# Patient Record
Sex: FEMALE | Race: WHITE | NOT HISPANIC OR LATINO | URBAN - METROPOLITAN AREA
[De-identification: names, ages, dates, MRNs, and addresses within clinical notes are randomized per-mention and may not be internally consistent; named-entity substitution may affect disease eponyms.]

---

## 2018-07-08 ENCOUNTER — EMERGENCY (EMERGENCY)
Facility: HOSPITAL | Age: 23
LOS: 1 days | Discharge: HOME | End: 2018-07-08
Attending: EMERGENCY MEDICINE | Admitting: EMERGENCY MEDICINE
Payer: COMMERCIAL

## 2018-07-08 VITALS
SYSTOLIC BLOOD PRESSURE: 119 MMHG | HEIGHT: 68 IN | WEIGHT: 139.99 LBS | TEMPERATURE: 98 F | RESPIRATION RATE: 20 BRPM | OXYGEN SATURATION: 100 % | HEART RATE: 81 BPM | DIASTOLIC BLOOD PRESSURE: 82 MMHG

## 2018-07-08 PROBLEM — Z00.00 ENCOUNTER FOR PREVENTIVE HEALTH EXAMINATION: Status: ACTIVE | Noted: 2018-07-08

## 2018-07-08 PROCEDURE — 73590 X-RAY EXAM OF LOWER LEG: CPT

## 2018-07-08 PROCEDURE — 99283 EMERGENCY DEPT VISIT LOW MDM: CPT | Mod: 25

## 2018-07-08 PROCEDURE — 12002 RPR S/N/AX/GEN/TRNK2.6-7.5CM: CPT
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
## 2018-07-09 PROCEDURE — 73590 X-RAY EXAM OF LOWER LEG: CPT | Mod: 26,LT

## 2019-09-06 ENCOUNTER — APPOINTMENT (OUTPATIENT)
Dept: GASTROENTEROLOGY | Facility: CLINIC | Age: 24
End: 2019-09-06

## 2020-05-27 ENCOUNTER — TRANSCRIPTION ENCOUNTER (OUTPATIENT)
Age: 25
End: 2020-05-27

## 2020-06-24 ENCOUNTER — TRANSCRIPTION ENCOUNTER (OUTPATIENT)
Age: 25
End: 2020-06-24

## 2020-10-28 ENCOUNTER — TRANSCRIPTION ENCOUNTER (OUTPATIENT)
Age: 25
End: 2020-10-28

## 2021-01-20 ENCOUNTER — TRANSCRIPTION ENCOUNTER (OUTPATIENT)
Age: 26
End: 2021-01-20

## 2021-02-10 ENCOUNTER — APPOINTMENT (OUTPATIENT)
Dept: DERMATOLOGY | Facility: CLINIC | Age: 26
End: 2021-02-10
Payer: SELF-PAY

## 2021-02-10 PROCEDURE — D0091R: CUSTOM

## 2021-05-05 ENCOUNTER — APPOINTMENT (OUTPATIENT)
Dept: DERMATOLOGY | Facility: CLINIC | Age: 26
End: 2021-05-05
Payer: SELF-PAY

## 2021-05-05 DIAGNOSIS — L98.8 OTHER SPECIFIED DISORDERS OF THE SKIN AND SUBCUTANEOUS TISSUE: ICD-10-CM

## 2021-05-05 PROCEDURE — D0090R: CUSTOM

## 2021-05-06 PROBLEM — L98.8 RHYTIDES: Status: ACTIVE | Noted: 2021-02-11

## 2021-07-12 ENCOUNTER — TRANSCRIPTION ENCOUNTER (OUTPATIENT)
Age: 26
End: 2021-07-12

## 2021-07-13 ENCOUNTER — TRANSCRIPTION ENCOUNTER (OUTPATIENT)
Age: 26
End: 2021-07-13

## 2021-07-19 ENCOUNTER — TRANSCRIPTION ENCOUNTER (OUTPATIENT)
Age: 26
End: 2021-07-19

## 2021-07-27 NOTE — ED PROVIDER NOTE - NS HIV RISK FACTOR YES
EMERGENCY DEPARTMENT HISTORY AND PHYSICAL EXAM      Date: 7/27/2021  Patient Name: Cruz Durham    History of Presenting Illness     Chief Complaint   Patient presents with    Back Pain     x 30 min. complaint of tightness from hips to armpits, which is preventing her from straightening up. History Provided By: Patient    HPI: Cruz Durham, 59 y.o. female history of arthritis presents ambulatory with her  to the ED with cc of a couple of hours of 9 out of 10 constant, achy thoracic back pain that is much worse with movement she tells me the pain is intense at times and takes her breath away. There is no chest pain or shortness of breath, per se. She convincingly denies any abdominal pain. There has been no nausea or vomiting. She has been well lately without fever. She tells me she worked out in the yard yesterday but denies performing any strenuous activity. Pain is well localized. She tells me earlier she could sort of feel something coming on and took a \"back aid\" and believes it may have helped a little. She has no medication allergies. There are no other complaints, changes, or physical findings at this time. PCP: Amy Davis MD      Past History     Past Medical History:  History reviewed. No pertinent past medical history. Past Surgical History:  Past Surgical History:   Procedure Laterality Date    HX GYN      tubes tied    HX ORTHOPAEDIC      thumb, knee, shoulder in the past       Family History:  History reviewed. No pertinent family history. Social History:  Social History     Tobacco Use    Smoking status: Never Smoker    Smokeless tobacco: Never Used   Substance Use Topics    Alcohol use: Never    Drug use: Never       Allergies:  No Known Allergies  Review of Systems   Review of Systems   Constitutional: Negative for fatigue and fever. HENT: Negative for ear pain and sore throat.     Eyes: Negative for pain, redness and visual disturbance. Respiratory: Negative for cough and shortness of breath. Cardiovascular: Negative for chest pain and palpitations. Gastrointestinal: Negative for abdominal pain, nausea and vomiting. Genitourinary: Negative for dysuria, frequency and urgency. Musculoskeletal: Positive for back pain (Thoracic back pain). Negative for gait problem, neck pain and neck stiffness. Skin: Negative for rash and wound. Neurological: Negative for dizziness, weakness, light-headedness, numbness and headaches. Physical Exam   Physical Exam  Vitals and nursing note reviewed. Constitutional:       General: She is not in acute distress. Appearance: She is well-developed. She is not toxic-appearing. HENT:      Head: Normocephalic and atraumatic. Jaw: No trismus. Right Ear: External ear normal.      Left Ear: External ear normal.      Nose: Nose normal.      Mouth/Throat:      Pharynx: Uvula midline. Eyes:      General: No scleral icterus. Conjunctiva/sclera: Conjunctivae normal.      Pupils: Pupils are equal, round, and reactive to light. Cardiovascular:      Rate and Rhythm: Normal rate and regular rhythm. Pulmonary:      Effort: Pulmonary effort is normal. No tachypnea, accessory muscle usage or respiratory distress. Breath sounds: No decreased breath sounds or wheezing. Abdominal:      Palpations: Abdomen is soft. Tenderness: There is no abdominal tenderness. Comments:   Flat; soft; nontender   Musculoskeletal:         General: Normal range of motion. Cervical back: Full passive range of motion without pain and normal range of motion. Thoracic back: Tenderness present. Back:       Comments:   THORACIC SPINE:  No bruising, redness or swelling  Diffuse thoracic back pain, greater on the right   Skin:     Findings: No rash. Neurological:      Mental Status: She is alert and oriented to person, place, and time. She is not disoriented.       GCS: GCS eye subscore is 4. GCS verbal subscore is 5. GCS motor subscore is 6. Cranial Nerves: No cranial nerve deficit. Psychiatric:         Speech: Speech normal.       Diagnostic Study Results     Labs -   No results found for this or any previous visit (from the past 12 hour(s)). Radiologic Studies -   XR SPINE THORAC 3 V   Final Result   No acute abnormality. CT Results  (Last 48 hours)    None        CXR Results  (Last 48 hours)    None        Medical Decision Making   I am the first provider for this patient. I reviewed the vital signs, available nursing notes, past medical history, past surgical history, family history and social history. Vital Signs-Reviewed the patient's vital signs. Patient Vitals for the past 12 hrs:   Temp Pulse Resp BP SpO2   07/27/21 1250 98.5 °F (36.9 °C) (!) 102 16 (!) 139/97 100 %       Pulse Oximetry Analysis - 100% on RA    Records Reviewed: Nursing Notes and Old Medical Records    Provider Notes (Medical Decision Making):   DDx: Compression fracture, HNP, DDD, strain, spasm    ED Course:   Initial assessment performed. The patients presenting problems have been discussed, and they are in agreement with the care plan formulated and outlined with them. I have encouraged them to ask questions as they arise throughout their visit. ED Course as of Jul 27 1531   Tue Jul 27, 2021   1521 Patient tells me her pain is improving after the Toradol. She tells me now it felt like a \"muscle spasm\". Plain films of the thoracic spine are negative for acute process. Abdomen remains soft and nontender and deep palpation yields no grimace or guarding. Anticipate discharge. [EJ]      ED Course User Index  [EJ] IRASEMA Avilez       Disposition:  Discharge    PLAN:  1.    Discharge Medication List as of 7/27/2021  3:24 PM      START taking these medications    Details   cyclobenzaprine (FLEXERIL) 10 mg tablet Take 1 Tablet by mouth three (3) times daily as needed for Muscle Spasm(s). , Normal, Disp-20 Tablet, R-0      ibuprofen (MOTRIN) 600 mg tablet Take 1 Tablet by mouth every six (6) hours as needed for Pain., Normal, Disp-20 Tablet, R-0         CONTINUE these medications which have NOT CHANGED    Details   buPROPion XL (Wellbutrin XL) 300 mg XL tablet Take 300 mg by mouth daily. , Historical Med      omega-3 acid ethyl esters (LOVAZA) 1 gram capsule Take 2 g by mouth two (2) times a day., Historical Med           2. Follow-up Information     Follow up With Specialties Details Why Contact Info    Perry Blank MD Orthopedic Surgery Call  [facility] / Dorothy Cervantes: as needed for any concerns [address]  [address]  [address] [facility]  [phone]          Return to ED if worse     Diagnosis     Clinical Impression:   1.  Acute bilateral thoracic back pain Declined

## 2021-08-25 ENCOUNTER — APPOINTMENT (OUTPATIENT)
Dept: DERMATOLOGY | Facility: CLINIC | Age: 26
End: 2021-08-25

## 2022-02-04 ENCOUNTER — RESULT REVIEW (OUTPATIENT)
Age: 27
End: 2022-02-04

## 2022-09-12 ENCOUNTER — RESULT REVIEW (OUTPATIENT)
Age: 27
End: 2022-09-12

## 2022-09-29 NOTE — ED ADULT TRIAGE NOTE - NS ED TRIAGE LAST STATUS DT
Left message for patient to call back regarding pre-visit planning. Please transfer call to [phone].      08-Jul-2018

## 2023-11-30 NOTE — ED ADULT TRIAGE NOTE - RESPIRATORY RATE (BREATHS/MIN)
Initiate Treatment: ketoconazole 2 % shampoo - Use to wash scalp, lather and let sit for 5 mins and rinse Continue Regimen: clindamycin phosphate 1 % topical solution - Apply to affected areas of the face once daily.\\n\\nclobetasol 0.05 % scalp solution - Apply to affected area of the scalp and neck once daily.\\n\\nhydrocortisone 2.5 % topical cream - Apply to affected area of the beard once daily for 2 weeks then stop. Detail Level: Zone Samples Given: Vanicream shampoo and conditioner 20
